# Patient Record
Sex: FEMALE | Race: WHITE | NOT HISPANIC OR LATINO | Employment: OTHER | URBAN - NONMETROPOLITAN AREA
[De-identification: names, ages, dates, MRNs, and addresses within clinical notes are randomized per-mention and may not be internally consistent; named-entity substitution may affect disease eponyms.]

---

## 2023-10-23 ENCOUNTER — HOSPITAL ENCOUNTER (OUTPATIENT)
Facility: HOSPITAL | Age: 78
Setting detail: OBSERVATION
Discharge: HOME | End: 2023-10-24
Attending: INTERNAL MEDICINE | Admitting: INTERNAL MEDICINE
Payer: MEDICARE

## 2023-10-23 ENCOUNTER — APPOINTMENT (OUTPATIENT)
Dept: RADIOLOGY | Facility: HOSPITAL | Age: 78
End: 2023-10-23
Payer: MEDICARE

## 2023-10-23 DIAGNOSIS — I10 HYPERTENSION, UNSPECIFIED TYPE: ICD-10-CM

## 2023-10-23 DIAGNOSIS — R07.9 CHEST PAIN, UNSPECIFIED TYPE: Primary | ICD-10-CM

## 2023-10-23 LAB
ALBUMIN SERPL BCP-MCNC: 4.4 G/DL (ref 3.4–5)
ALP SERPL-CCNC: 86 U/L (ref 33–136)
ALT SERPL W P-5'-P-CCNC: 8 U/L (ref 7–45)
ANION GAP SERPL CALC-SCNC: 17 MMOL/L (ref 10–20)
APPEARANCE UR: CLEAR
AST SERPL W P-5'-P-CCNC: 17 U/L (ref 9–39)
BASOPHILS # BLD AUTO: 0.04 X10*3/UL (ref 0–0.1)
BASOPHILS NFR BLD AUTO: 0.5 %
BILIRUB SERPL-MCNC: 0.7 MG/DL (ref 0–1.2)
BILIRUB UR STRIP.AUTO-MCNC: NEGATIVE MG/DL
BNP SERPL-MCNC: 31 PG/ML (ref 0–99)
BUN SERPL-MCNC: 16 MG/DL (ref 6–23)
CALCIUM SERPL-MCNC: 9.5 MG/DL (ref 8.6–10.3)
CARDIAC TROPONIN I PNL SERPL HS: 4 NG/L (ref 0–13)
CARDIAC TROPONIN I PNL SERPL HS: 5 NG/L (ref 0–13)
CHLORIDE SERPL-SCNC: 88 MMOL/L (ref 98–107)
CO2 SERPL-SCNC: 26 MMOL/L (ref 21–32)
COLOR UR: ABNORMAL
CREAT SERPL-MCNC: 0.9 MG/DL (ref 0.5–1.05)
EOSINOPHIL # BLD AUTO: 0.08 X10*3/UL (ref 0–0.4)
EOSINOPHIL NFR BLD AUTO: 1 %
ERYTHROCYTE [DISTWIDTH] IN BLOOD BY AUTOMATED COUNT: 11.7 % (ref 11.5–14.5)
GFR SERPL CREATININE-BSD FRML MDRD: 66 ML/MIN/1.73M*2
GLUCOSE SERPL-MCNC: 105 MG/DL (ref 74–99)
GLUCOSE UR STRIP.AUTO-MCNC: NEGATIVE MG/DL
HCT VFR BLD AUTO: 41 % (ref 36–46)
HGB BLD-MCNC: 14.4 G/DL (ref 12–16)
HOLD SPECIMEN: NORMAL
IMM GRANULOCYTES # BLD AUTO: 0.02 X10*3/UL (ref 0–0.5)
IMM GRANULOCYTES NFR BLD AUTO: 0.3 % (ref 0–0.9)
INR PPP: 1.1 (ref 0.9–1.1)
KETONES UR STRIP.AUTO-MCNC: ABNORMAL MG/DL
LEUKOCYTE ESTERASE UR QL STRIP.AUTO: NEGATIVE
LYMPHOCYTES # BLD AUTO: 1.68 X10*3/UL (ref 0.8–3)
LYMPHOCYTES NFR BLD AUTO: 21.2 %
MAGNESIUM SERPL-MCNC: 1.74 MG/DL (ref 1.6–2.4)
MCH RBC QN AUTO: 29.5 PG (ref 26–34)
MCHC RBC AUTO-ENTMCNC: 35.1 G/DL (ref 32–36)
MCV RBC AUTO: 84 FL (ref 80–100)
MONOCYTES # BLD AUTO: 0.58 X10*3/UL (ref 0.05–0.8)
MONOCYTES NFR BLD AUTO: 7.3 %
NEUTROPHILS # BLD AUTO: 5.54 X10*3/UL (ref 1.6–5.5)
NEUTROPHILS NFR BLD AUTO: 69.7 %
NITRITE UR QL STRIP.AUTO: NEGATIVE
NRBC BLD-RTO: 0 /100 WBCS (ref 0–0)
PH UR STRIP.AUTO: 6 [PH]
PLATELET # BLD AUTO: 271 X10*3/UL (ref 150–450)
PMV BLD AUTO: 11 FL (ref 7.5–11.5)
POTASSIUM SERPL-SCNC: 3.8 MMOL/L (ref 3.5–5.3)
PROT SERPL-MCNC: 7.5 G/DL (ref 6.4–8.2)
PROT UR STRIP.AUTO-MCNC: NEGATIVE MG/DL
PROTHROMBIN TIME: 12.8 SECONDS (ref 9.8–12.8)
RBC # BLD AUTO: 4.88 X10*6/UL (ref 4–5.2)
RBC # UR STRIP.AUTO: NEGATIVE /UL
SODIUM SERPL-SCNC: 127 MMOL/L (ref 136–145)
SP GR UR STRIP.AUTO: 1.01
UROBILINOGEN UR STRIP.AUTO-MCNC: <2 MG/DL
WBC # BLD AUTO: 7.9 X10*3/UL (ref 4.4–11.3)

## 2023-10-23 PROCEDURE — 36415 COLL VENOUS BLD VENIPUNCTURE: CPT | Performed by: HEALTH CARE PROVIDER

## 2023-10-23 PROCEDURE — 84484 ASSAY OF TROPONIN QUANT: CPT | Performed by: HEALTH CARE PROVIDER

## 2023-10-23 PROCEDURE — 2500000001 HC RX 250 WO HCPCS SELF ADMINISTERED DRUGS (ALT 637 FOR MEDICARE OP): Performed by: NURSE PRACTITIONER

## 2023-10-23 PROCEDURE — 85025 COMPLETE CBC W/AUTO DIFF WBC: CPT | Performed by: HEALTH CARE PROVIDER

## 2023-10-23 PROCEDURE — 80053 COMPREHEN METABOLIC PANEL: CPT | Performed by: HEALTH CARE PROVIDER

## 2023-10-23 PROCEDURE — 99285 EMERGENCY DEPT VISIT HI MDM: CPT

## 2023-10-23 PROCEDURE — 2500000004 HC RX 250 GENERAL PHARMACY W/ HCPCS (ALT 636 FOR OP/ED): Performed by: HEALTH CARE PROVIDER

## 2023-10-23 PROCEDURE — 71045 X-RAY EXAM CHEST 1 VIEW: CPT | Mod: FY,FR

## 2023-10-23 PROCEDURE — 85610 PROTHROMBIN TIME: CPT | Performed by: HEALTH CARE PROVIDER

## 2023-10-23 PROCEDURE — C9113 INJ PANTOPRAZOLE SODIUM, VIA: HCPCS

## 2023-10-23 PROCEDURE — G0378 HOSPITAL OBSERVATION PER HR: HCPCS

## 2023-10-23 PROCEDURE — 81003 URINALYSIS AUTO W/O SCOPE: CPT | Performed by: HEALTH CARE PROVIDER

## 2023-10-23 PROCEDURE — 2500000004 HC RX 250 GENERAL PHARMACY W/ HCPCS (ALT 636 FOR OP/ED): Performed by: NURSE PRACTITIONER

## 2023-10-23 PROCEDURE — 2500000001 HC RX 250 WO HCPCS SELF ADMINISTERED DRUGS (ALT 637 FOR MEDICARE OP)

## 2023-10-23 PROCEDURE — 96374 THER/PROPH/DIAG INJ IV PUSH: CPT

## 2023-10-23 PROCEDURE — 83735 ASSAY OF MAGNESIUM: CPT | Performed by: HEALTH CARE PROVIDER

## 2023-10-23 PROCEDURE — 71045 X-RAY EXAM CHEST 1 VIEW: CPT | Mod: FOREIGN READ | Performed by: RADIOLOGY

## 2023-10-23 PROCEDURE — 82947 ASSAY GLUCOSE BLOOD QUANT: CPT | Performed by: HEALTH CARE PROVIDER

## 2023-10-23 PROCEDURE — 2500000004 HC RX 250 GENERAL PHARMACY W/ HCPCS (ALT 636 FOR OP/ED)

## 2023-10-23 PROCEDURE — 96361 HYDRATE IV INFUSION ADD-ON: CPT

## 2023-10-23 PROCEDURE — 83880 ASSAY OF NATRIURETIC PEPTIDE: CPT | Performed by: HEALTH CARE PROVIDER

## 2023-10-23 RX ORDER — NAPROXEN SODIUM 220 MG/1
TABLET, FILM COATED ORAL
Status: COMPLETED
Start: 2023-10-23 | End: 2023-10-23

## 2023-10-23 RX ORDER — NAPROXEN SODIUM 220 MG/1
324 TABLET, FILM COATED ORAL ONCE
Status: COMPLETED | OUTPATIENT
Start: 2023-10-23 | End: 2023-10-23

## 2023-10-23 RX ORDER — ENOXAPARIN SODIUM 100 MG/ML
40 INJECTION SUBCUTANEOUS EVERY 24 HOURS
Status: DISCONTINUED | OUTPATIENT
Start: 2023-10-23 | End: 2023-10-24 | Stop reason: HOSPADM

## 2023-10-23 RX ORDER — SODIUM CHLORIDE 9 MG/ML
100 INJECTION, SOLUTION INTRAVENOUS CONTINUOUS
Status: DISCONTINUED | OUTPATIENT
Start: 2023-10-23 | End: 2023-10-24

## 2023-10-23 RX ORDER — CYCLOBENZAPRINE HCL 5 MG
5 TABLET ORAL 2 TIMES DAILY PRN
COMMUNITY
Start: 2020-01-13

## 2023-10-23 RX ORDER — PANTOPRAZOLE SODIUM 40 MG/10ML
40 INJECTION, POWDER, LYOPHILIZED, FOR SOLUTION INTRAVENOUS ONCE
Status: COMPLETED | OUTPATIENT
Start: 2023-10-23 | End: 2023-10-23

## 2023-10-23 RX ORDER — ACETAMINOPHEN 325 MG/1
TABLET ORAL
Status: COMPLETED
Start: 2023-10-23 | End: 2023-10-23

## 2023-10-23 RX ORDER — OLMESARTAN MEDOXOMIL 20 MG/1
20 TABLET ORAL 2 TIMES DAILY
COMMUNITY
Start: 2023-10-18

## 2023-10-23 RX ORDER — ACETAMINOPHEN 325 MG/1
650 TABLET ORAL EVERY 4 HOURS PRN
Status: DISCONTINUED | OUTPATIENT
Start: 2023-10-23 | End: 2023-10-24 | Stop reason: HOSPADM

## 2023-10-23 RX ORDER — ONDANSETRON 4 MG/1
4 TABLET, FILM COATED ORAL EVERY 8 HOURS PRN
Status: DISCONTINUED | OUTPATIENT
Start: 2023-10-23 | End: 2023-10-24 | Stop reason: HOSPADM

## 2023-10-23 RX ORDER — ACETAMINOPHEN 325 MG/1
650 TABLET ORAL ONCE
Status: COMPLETED | OUTPATIENT
Start: 2023-10-23 | End: 2023-10-23

## 2023-10-23 RX ORDER — ONDANSETRON HYDROCHLORIDE 2 MG/ML
4 INJECTION, SOLUTION INTRAVENOUS EVERY 8 HOURS PRN
Status: DISCONTINUED | OUTPATIENT
Start: 2023-10-23 | End: 2023-10-24 | Stop reason: HOSPADM

## 2023-10-23 RX ORDER — ALBUTEROL SULFATE 0.83 MG/ML
2.5 SOLUTION RESPIRATORY (INHALATION) EVERY 4 HOURS PRN
COMMUNITY
Start: 2023-09-21

## 2023-10-23 RX ORDER — ACETAMINOPHEN 650 MG/1
650 SUPPOSITORY RECTAL EVERY 4 HOURS PRN
Status: DISCONTINUED | OUTPATIENT
Start: 2023-10-23 | End: 2023-10-24 | Stop reason: HOSPADM

## 2023-10-23 RX ORDER — ASPIRIN 81 MG/1
81 TABLET ORAL DAILY
Status: DISCONTINUED | OUTPATIENT
Start: 2023-10-24 | End: 2023-10-24

## 2023-10-23 RX ORDER — CHOLECALCIFEROL (VITAMIN D3) 25 MCG
2000 TABLET ORAL
COMMUNITY
Start: 2023-10-12

## 2023-10-23 RX ORDER — DIAZEPAM 2 MG/1
2 TABLET ORAL DAILY PRN
COMMUNITY
Start: 2023-10-12 | End: 2023-10-26

## 2023-10-23 RX ORDER — POLYETHYLENE GLYCOL 3350 17 G/17G
17 POWDER, FOR SOLUTION ORAL DAILY
Status: DISCONTINUED | OUTPATIENT
Start: 2023-10-23 | End: 2023-10-24 | Stop reason: HOSPADM

## 2023-10-23 RX ORDER — PANTOPRAZOLE SODIUM 40 MG/10ML
40 INJECTION, POWDER, LYOPHILIZED, FOR SOLUTION INTRAVENOUS
Status: DISCONTINUED | OUTPATIENT
Start: 2023-10-24 | End: 2023-10-24 | Stop reason: HOSPADM

## 2023-10-23 RX ORDER — ALUMINUM HYDROXIDE, MAGNESIUM HYDROXIDE, AND SIMETHICONE 1200; 120; 1200 MG/30ML; MG/30ML; MG/30ML
30 SUSPENSION ORAL EVERY 6 HOURS PRN
Status: DISCONTINUED | OUTPATIENT
Start: 2023-10-23 | End: 2023-10-24 | Stop reason: HOSPADM

## 2023-10-23 RX ORDER — ALUMINUM HYDROXIDE, MAGNESIUM HYDROXIDE, AND SIMETHICONE 1200; 120; 1200 MG/30ML; MG/30ML; MG/30ML
SUSPENSION ORAL
Status: COMPLETED
Start: 2023-10-23 | End: 2023-10-23

## 2023-10-23 RX ORDER — ACETAMINOPHEN 160 MG/5ML
650 SOLUTION ORAL EVERY 4 HOURS PRN
Status: DISCONTINUED | OUTPATIENT
Start: 2023-10-23 | End: 2023-10-24 | Stop reason: HOSPADM

## 2023-10-23 RX ORDER — ASPIRIN 81 MG/1
81 TABLET ORAL
COMMUNITY

## 2023-10-23 RX ORDER — HYDROCHLOROTHIAZIDE 12.5 MG/1
12.5 CAPSULE ORAL
COMMUNITY
Start: 2023-10-18 | End: 2023-10-24 | Stop reason: HOSPADM

## 2023-10-23 RX ORDER — PANTOPRAZOLE SODIUM 40 MG/1
40 TABLET, DELAYED RELEASE ORAL
Status: DISCONTINUED | OUTPATIENT
Start: 2023-10-24 | End: 2023-10-24 | Stop reason: HOSPADM

## 2023-10-23 RX ORDER — GABAPENTIN 300 MG/1
300 CAPSULE ORAL 2 TIMES DAILY
COMMUNITY
Start: 2023-10-12 | End: 2023-11-11

## 2023-10-23 RX ORDER — PANTOPRAZOLE SODIUM 40 MG/10ML
INJECTION, POWDER, LYOPHILIZED, FOR SOLUTION INTRAVENOUS
Status: COMPLETED
Start: 2023-10-23 | End: 2023-10-23

## 2023-10-23 RX ORDER — ALBUTEROL SULFATE 90 UG/1
2 AEROSOL, METERED RESPIRATORY (INHALATION) EVERY 6 HOURS PRN
COMMUNITY
Start: 2023-09-26

## 2023-10-23 RX ORDER — ALUMINUM HYDROXIDE, MAGNESIUM HYDROXIDE, AND SIMETHICONE 1200; 120; 1200 MG/30ML; MG/30ML; MG/30ML
30 SUSPENSION ORAL ONCE
Status: COMPLETED | OUTPATIENT
Start: 2023-10-23 | End: 2023-10-23

## 2023-10-23 RX ORDER — TRAMADOL HYDROCHLORIDE 50 MG/1
25 TABLET ORAL NIGHTLY PRN
COMMUNITY

## 2023-10-23 RX ADMIN — PANTOPRAZOLE SODIUM 40 MG: 40 INJECTION, POWDER, LYOPHILIZED, FOR SOLUTION INTRAVENOUS at 15:14

## 2023-10-23 RX ADMIN — ALUMINUM HYDROXIDE, MAGNESIUM HYDROXIDE, AND SIMETHICONE 30 ML: 200; 200; 20 SUSPENSION ORAL at 21:18

## 2023-10-23 RX ADMIN — ACETAMINOPHEN 650 MG: 325 TABLET ORAL at 16:17

## 2023-10-23 RX ADMIN — PANTOPRAZOLE SODIUM 40 MG: 40 INJECTION, POWDER, FOR SOLUTION INTRAVENOUS at 15:14

## 2023-10-23 RX ADMIN — SODIUM CHLORIDE 100 ML/HR: 9 INJECTION, SOLUTION INTRAVENOUS at 21:18

## 2023-10-23 RX ADMIN — SODIUM CHLORIDE 1000 ML: 9 INJECTION, SOLUTION INTRAVENOUS at 16:21

## 2023-10-23 RX ADMIN — NAPROXEN SODIUM 324 MG: 220 TABLET, FILM COATED ORAL at 15:18

## 2023-10-23 RX ADMIN — ALUMINUM HYDROXIDE, MAGNESIUM HYDROXIDE, AND SIMETHICONE 30 ML: 1200; 120; 1200 SUSPENSION ORAL at 16:18

## 2023-10-23 RX ADMIN — ALUMINUM HYDROXIDE, MAGNESIUM HYDROXIDE, AND SIMETHICONE 30 ML: 200; 200; 20 SUSPENSION ORAL at 16:18

## 2023-10-23 RX ADMIN — ASPIRIN 81 MG CHEWABLE TABLET 324 MG: 81 TABLET CHEWABLE at 15:18

## 2023-10-23 RX ADMIN — ACETAMINOPHEN 650 MG: 325 TABLET ORAL at 21:18

## 2023-10-23 SDOH — SOCIAL STABILITY: SOCIAL INSECURITY: WERE YOU ABLE TO COMPLETE ALL THE BEHAVIORAL HEALTH SCREENINGS?: YES

## 2023-10-23 SDOH — SOCIAL STABILITY: SOCIAL INSECURITY: HAS ANYONE EVER THREATENED TO HURT YOUR FAMILY OR YOUR PETS?: NO

## 2023-10-23 SDOH — SOCIAL STABILITY: SOCIAL INSECURITY: ARE THERE ANY APPARENT SIGNS OF INJURIES/BEHAVIORS THAT COULD BE RELATED TO ABUSE/NEGLECT?: NO

## 2023-10-23 SDOH — SOCIAL STABILITY: SOCIAL INSECURITY: ABUSE: ADULT

## 2023-10-23 SDOH — SOCIAL STABILITY: SOCIAL INSECURITY: ARE YOU OR HAVE YOU BEEN THREATENED OR ABUSED PHYSICALLY, EMOTIONALLY, OR SEXUALLY BY ANYONE?: NO

## 2023-10-23 SDOH — SOCIAL STABILITY: SOCIAL INSECURITY: DO YOU FEEL UNSAFE GOING BACK TO THE PLACE WHERE YOU ARE LIVING?: NO

## 2023-10-23 SDOH — SOCIAL STABILITY: SOCIAL INSECURITY: HAVE YOU HAD THOUGHTS OF HARMING ANYONE ELSE?: NO

## 2023-10-23 SDOH — SOCIAL STABILITY: SOCIAL INSECURITY: DO YOU FEEL ANYONE HAS EXPLOITED OR TAKEN ADVANTAGE OF YOU FINANCIALLY OR OF YOUR PERSONAL PROPERTY?: NO

## 2023-10-23 SDOH — SOCIAL STABILITY: SOCIAL INSECURITY: DOES ANYONE TRY TO KEEP YOU FROM HAVING/CONTACTING OTHER FRIENDS OR DOING THINGS OUTSIDE YOUR HOME?: NO

## 2023-10-23 ASSESSMENT — ACTIVITIES OF DAILY LIVING (ADL)
HEARING - RIGHT EAR: FUNCTIONAL
GROOMING: INDEPENDENT
HEARING - LEFT EAR: FUNCTIONAL
BATHING: INDEPENDENT
HEARING - RIGHT EAR: FUNCTIONAL
HEARING - LEFT EAR: FUNCTIONAL
TOILETING: INDEPENDENT
FEEDING YOURSELF: INDEPENDENT
FEEDING YOURSELF: INDEPENDENT
DRESSING YOURSELF: INDEPENDENT
WALKS IN HOME: INDEPENDENT
TOILETING: INDEPENDENT
ADEQUATE_TO_COMPLETE_ADL: YES
DRESSING YOURSELF: INDEPENDENT
LACK_OF_TRANSPORTATION: NO
PATIENT'S MEMORY ADEQUATE TO SAFELY COMPLETE DAILY ACTIVITIES?: YES
BATHING: INDEPENDENT
ADEQUATE_TO_COMPLETE_ADL: YES
GROOMING: INDEPENDENT
PATIENT'S MEMORY ADEQUATE TO SAFELY COMPLETE DAILY ACTIVITIES?: YES
ASSISTIVE_DEVICE: EYEGLASSES
JUDGMENT_ADEQUATE_SAFELY_COMPLETE_DAILY_ACTIVITIES: YES
JUDGMENT_ADEQUATE_SAFELY_COMPLETE_DAILY_ACTIVITIES: YES
WALKS IN HOME: INDEPENDENT

## 2023-10-23 ASSESSMENT — LIFESTYLE VARIABLES
SKIP TO QUESTIONS 9-10: 1
HOW OFTEN DO YOU HAVE A DRINK CONTAINING ALCOHOL: NEVER
AUDIT-C TOTAL SCORE: 0
HOW MANY STANDARD DRINKS CONTAINING ALCOHOL DO YOU HAVE ON A TYPICAL DAY: PATIENT DOES NOT DRINK
AUDIT-C TOTAL SCORE: 0
HOW OFTEN DO YOU HAVE 6 OR MORE DRINKS ON ONE OCCASION: NEVER

## 2023-10-23 ASSESSMENT — HEART SCORE
HISTORY: SLIGHTLY SUSPICIOUS
HEART SCORE: 4
RISK FACTORS: >2 RISK FACTORS OR HX OF ATHEROSCLEROTIC DISEASE
AGE: 65+
TROPONIN: LESS THAN OR EQUAL TO NORMAL LIMIT
ECG: NORMAL

## 2023-10-23 ASSESSMENT — COGNITIVE AND FUNCTIONAL STATUS - GENERAL
PATIENT BASELINE BEDBOUND: NO
DAILY ACTIVITIY SCORE: 24
MOBILITY SCORE: 24

## 2023-10-23 ASSESSMENT — PAIN SCALES - GENERAL
PAINLEVEL_OUTOF10: 4
PAINLEVEL_OUTOF10: 4
PAINLEVEL_OUTOF10: 6
PAINLEVEL_OUTOF10: 4

## 2023-10-23 ASSESSMENT — PAIN DESCRIPTION - LOCATION: LOCATION: HEAD

## 2023-10-23 ASSESSMENT — COLUMBIA-SUICIDE SEVERITY RATING SCALE - C-SSRS
1. IN THE PAST MONTH, HAVE YOU WISHED YOU WERE DEAD OR WISHED YOU COULD GO TO SLEEP AND NOT WAKE UP?: NO
2. HAVE YOU ACTUALLY HAD ANY THOUGHTS OF KILLING YOURSELF?: NO
6. HAVE YOU EVER DONE ANYTHING, STARTED TO DO ANYTHING, OR PREPARED TO DO ANYTHING TO END YOUR LIFE?: NO

## 2023-10-23 ASSESSMENT — PAIN SCALES - WONG BAKER: WONGBAKER_NUMERICALRESPONSE: HURTS LITTLE MORE

## 2023-10-23 ASSESSMENT — PATIENT HEALTH QUESTIONNAIRE - PHQ9
2. FEELING DOWN, DEPRESSED OR HOPELESS: NOT AT ALL
1. LITTLE INTEREST OR PLEASURE IN DOING THINGS: NOT AT ALL
SUM OF ALL RESPONSES TO PHQ9 QUESTIONS 1 & 2: 0

## 2023-10-23 ASSESSMENT — PAIN DESCRIPTION - DESCRIPTORS: DESCRIPTORS: DISCOMFORT

## 2023-10-23 ASSESSMENT — PAIN DESCRIPTION - PROGRESSION: CLINICAL_PROGRESSION: NOT CHANGED

## 2023-10-23 ASSESSMENT — PAIN - FUNCTIONAL ASSESSMENT
PAIN_FUNCTIONAL_ASSESSMENT: 0-10
PAIN_FUNCTIONAL_ASSESSMENT: 0-10

## 2023-10-23 NOTE — ED PROVIDER NOTES
HPI   Chief Complaint   Patient presents with    Hypertension     Hypertension , headache , fatigue       CC:, Hypertension, chest tightness  HPI:   78-year-old female with history of hypertension, SVT, LVH, migraines, presents ED complaining of hypertension, patient states that she was recently taken off of her diltiazem approximately a week ago and placed on 12.5 mg hydrochlorothiazide, she is currently on 50 mg of losartan daily.  Patient is complaining of some tightness burning sensation midsternal, she also reports increased fatigue, exertional shortness of breath.  Denies having any fever, chills, she denies any headache, dizziness.  Does not take any long-term anticoagulant or antiplatelet medications.  Denies any recent weight gain or lower extremity edema.      Additional Limitations to History:   External Records Reviewed: I reviewed recent and relevant outside records including   History Obtained From:     Past Medical History: Per HPI  Medications: Reviewed in EMR and with patient  Allergies:  Reviewed in EMR  Past Surgical History:   Social History:     ------------------------------------------------------------------------------------------------------  Physical Exam:  --Vital signs reviewed in nursing triage note, EMR flow sheets, and at patient's bedside  GEN:  A&Ox3, no acute distress, appears comfortable.  Conversational and appropriate.  No confusion or gross mental status changes.  EYES: EOMI, non-injected sclera.  ENT: Moist mucous membranes, no apparent injuries or lesions.   CARDIO: Normal rate and regular rhythm. No murmurs, rubs, or gallops.  2+ equal pulses of the distal extremities.   PULM: Clear to auscultation bilaterally. No rales, rhonchi, or wheezes. Good symmetric chest expansion.  GI: Soft, non-tender, non-distended. No rebound tenderness or guarding.  SKIN: Warm and dry, no rashes or lesions.  MSK: ROM intact the extremities without contractures.   EXT: No peripheral edema,  contusions, or wounds.   NEURO: Cranial nerves II-XII grossly intact. Sensation to light touch intact and equal bilaterally in upper and lower extremities.  Symmetric 5/5 strength in upper and lower extremities.  PSYCH: Appropriate mood and behavior, converses and responds appropriately during exam.  -------------------------------------------------------------------------------------------------------    Medical Decision Making:      Differential Diagnoses Considered:   Chronic Medical Conditions Significantly Affecting Care:   Diagnostic testing considered: [PERC, D-Dimer, PECARN, etc.]    - EKG interpreted by myself sinus rhythm, ventricular rate 82, no obvious ST elevation/depression or T wave inversion no acute ischemic findings  - I independently interpreted: [CXR, CT, POCUS, etc. including your interpretation]  - Labs notable for     Escalation of Care: Appropriate for   Social Determinants of Health Significantly Affecting Care: [Homelessness, lacking transportation, uninsured, unable to afford medications]  Prescription Drug Consideration: [Antibiotics, antivirals, pain medications, etc.]  Discussion of Management with Other Providers:  I discussed the patient/results with: [admitting team, consultant, radiologist, social work, EPAT, case management, PT/OT, RT, PCP, etc.]      Nikolay Acevedo PA-C      Chest tightness                    No data recorded                Patient History   Past Medical History:   Diagnosis Date    Personal history of other diseases of the circulatory system 03/04/2020    History of hypertension    Personal history of other diseases of the musculoskeletal system and connective tissue 03/04/2020    History of fibromyalgia     Past Surgical History:   Procedure Laterality Date    OTHER SURGICAL HISTORY  03/04/2020    Appendectomy laparoscopic     No family history on file.  Social History     Tobacco Use    Smoking status: Not on file    Smokeless tobacco: Not on file   Substance  Use Topics    Alcohol use: Not on file    Drug use: Not on file       Physical Exam   ED Triage Vitals [10/23/23 1418]   Temp Heart Rate Resp BP   36.6 °C (97.8 °F) 101 14 (!) 152/112      SpO2 Temp Source Heart Rate Source Patient Position   96 % Tympanic -- Sitting      BP Location FiO2 (%)     Left arm --       Physical Exam    ED Course & MDM   Diagnoses as of 10/23/23 1709   Chest pain, unspecified type       Medical Decision Making  78-year-old female with complaints of nonspecific chest tightness or heaviness, hypertension, patient is hemodynamically stable, nontoxic-appearing, well-hydrated afebrile in no acute distress, low suspicion for acute coronary event or pulmonary emboli, low suspicion for hypertensive emergency or urgency.  Laboratory work-up notable for mild hyponatremia she was given IV fluids patient will be admitted to telemetry observation for further evaluation        Procedure  Procedures     Nikolay Acevedo PA-C  11/06/23 0737

## 2023-10-24 ENCOUNTER — APPOINTMENT (OUTPATIENT)
Dept: CARDIOLOGY | Facility: HOSPITAL | Age: 78
End: 2023-10-24
Payer: MEDICARE

## 2023-10-24 VITALS
HEIGHT: 65 IN | WEIGHT: 133.82 LBS | BODY MASS INDEX: 22.3 KG/M2 | TEMPERATURE: 97.7 F | RESPIRATION RATE: 16 BRPM | SYSTOLIC BLOOD PRESSURE: 131 MMHG | HEART RATE: 67 BPM | DIASTOLIC BLOOD PRESSURE: 77 MMHG | OXYGEN SATURATION: 97 %

## 2023-10-24 PROBLEM — M79.7 FIBROMYALGIA: Status: ACTIVE | Noted: 2023-10-24

## 2023-10-24 PROBLEM — J45.909 ASTHMA (HHS-HCC): Status: ACTIVE | Noted: 2023-10-24

## 2023-10-24 PROBLEM — I10 HTN (HYPERTENSION): Status: ACTIVE | Noted: 2023-10-24

## 2023-10-24 PROBLEM — I47.10 SVT (SUPRAVENTRICULAR TACHYCARDIA) (CMS-HCC): Status: ACTIVE | Noted: 2023-10-24

## 2023-10-24 PROBLEM — I38: Status: ACTIVE | Noted: 2023-10-24

## 2023-10-24 PROBLEM — F41.9 ANXIETY: Status: ACTIVE | Noted: 2023-10-24

## 2023-10-24 PROBLEM — E78.5 HLD (HYPERLIPIDEMIA): Status: ACTIVE | Noted: 2023-10-24

## 2023-10-24 LAB
ANION GAP SERPL CALC-SCNC: 13 MMOL/L (ref 10–20)
BUN SERPL-MCNC: 13 MG/DL (ref 6–23)
CALCIUM SERPL-MCNC: 8.4 MG/DL (ref 8.6–10.3)
CHLORIDE SERPL-SCNC: 99 MMOL/L (ref 98–107)
CHOLEST SERPL-MCNC: 194 MG/DL (ref 0–199)
CHOLESTEROL/HDL RATIO: 3.5
CO2 SERPL-SCNC: 22 MMOL/L (ref 21–32)
CREAT SERPL-MCNC: 0.77 MG/DL (ref 0.5–1.05)
CRP SERPL-MCNC: 0.43 MG/DL
EJECTION FRACTION APICAL 4 CHAMBER: 58.1
ERYTHROCYTE [DISTWIDTH] IN BLOOD BY AUTOMATED COUNT: 11.7 % (ref 11.5–14.5)
ERYTHROCYTE [SEDIMENTATION RATE] IN BLOOD BY WESTERGREN METHOD: 2 MM/H (ref 0–30)
GFR SERPL CREATININE-BSD FRML MDRD: 80 ML/MIN/1.73M*2
GLUCOSE SERPL-MCNC: 97 MG/DL (ref 74–99)
HCT VFR BLD AUTO: 36 % (ref 36–46)
HDLC SERPL-MCNC: 56.2 MG/DL
HGB BLD-MCNC: 12.5 G/DL (ref 12–16)
LDLC SERPL CALC-MCNC: 123 MG/DL
MCH RBC QN AUTO: 29.7 PG (ref 26–34)
MCHC RBC AUTO-ENTMCNC: 34.7 G/DL (ref 32–36)
MCV RBC AUTO: 86 FL (ref 80–100)
NON HDL CHOLESTEROL: 138 MG/DL (ref 0–149)
NRBC BLD-RTO: 0 /100 WBCS (ref 0–0)
PLATELET # BLD AUTO: 217 X10*3/UL (ref 150–450)
PMV BLD AUTO: 10.5 FL (ref 7.5–11.5)
POTASSIUM SERPL-SCNC: 3.6 MMOL/L (ref 3.5–5.3)
RBC # BLD AUTO: 4.21 X10*6/UL (ref 4–5.2)
SODIUM SERPL-SCNC: 130 MMOL/L (ref 136–145)
TRIGL SERPL-MCNC: 75 MG/DL (ref 0–149)
VLDL: 15 MG/DL (ref 0–40)
WBC # BLD AUTO: 5.4 X10*3/UL (ref 4.4–11.3)

## 2023-10-24 PROCEDURE — 85652 RBC SED RATE AUTOMATED: CPT | Performed by: NURSE PRACTITIONER

## 2023-10-24 PROCEDURE — 96361 HYDRATE IV INFUSION ADD-ON: CPT

## 2023-10-24 PROCEDURE — 2500000004 HC RX 250 GENERAL PHARMACY W/ HCPCS (ALT 636 FOR OP/ED): Performed by: NURSE PRACTITIONER

## 2023-10-24 PROCEDURE — 86140 C-REACTIVE PROTEIN: CPT | Performed by: NURSE PRACTITIONER

## 2023-10-24 PROCEDURE — 80061 LIPID PANEL: CPT | Performed by: NURSE PRACTITIONER

## 2023-10-24 PROCEDURE — 93306 TTE W/DOPPLER COMPLETE: CPT

## 2023-10-24 PROCEDURE — 36415 COLL VENOUS BLD VENIPUNCTURE: CPT | Performed by: NURSE PRACTITIONER

## 2023-10-24 PROCEDURE — G0378 HOSPITAL OBSERVATION PER HR: HCPCS

## 2023-10-24 PROCEDURE — 2500000001 HC RX 250 WO HCPCS SELF ADMINISTERED DRUGS (ALT 637 FOR MEDICARE OP): Performed by: NURSE PRACTITIONER

## 2023-10-24 PROCEDURE — 85027 COMPLETE CBC AUTOMATED: CPT | Performed by: NURSE PRACTITIONER

## 2023-10-24 PROCEDURE — 99233 SBSQ HOSP IP/OBS HIGH 50: CPT | Performed by: NURSE PRACTITIONER

## 2023-10-24 PROCEDURE — G0316 PR PROLONGED INPATIENT/OBSERVATION EM SVC EA 15 MIN: HCPCS | Performed by: NURSE PRACTITIONER

## 2023-10-24 PROCEDURE — 80048 BASIC METABOLIC PNL TOTAL CA: CPT | Performed by: NURSE PRACTITIONER

## 2023-10-24 RX ORDER — NEBIVOLOL 2.5 MG/1
2.5 TABLET ORAL DAILY
Status: DISCONTINUED | OUTPATIENT
Start: 2023-10-24 | End: 2023-10-24 | Stop reason: HOSPADM

## 2023-10-24 RX ORDER — OLMESARTAN MEDOXOMIL 20 MG/1
20 TABLET ORAL 2 TIMES DAILY
Status: DISCONTINUED | OUTPATIENT
Start: 2023-10-24 | End: 2023-10-24 | Stop reason: HOSPADM

## 2023-10-24 RX ORDER — HYDROCHLOROTHIAZIDE 12.5 MG/1
12.5 TABLET ORAL DAILY
Status: DISCONTINUED | OUTPATIENT
Start: 2023-10-24 | End: 2023-10-24 | Stop reason: HOSPADM

## 2023-10-24 RX ORDER — GABAPENTIN 300 MG/1
300 CAPSULE ORAL 2 TIMES DAILY
Status: DISCONTINUED | OUTPATIENT
Start: 2023-10-24 | End: 2023-10-24 | Stop reason: HOSPADM

## 2023-10-24 RX ORDER — ASPIRIN 81 MG/1
81 TABLET ORAL
Status: DISCONTINUED | OUTPATIENT
Start: 2023-10-24 | End: 2023-10-24 | Stop reason: HOSPADM

## 2023-10-24 RX ORDER — ALBUTEROL SULFATE 90 UG/1
2 AEROSOL, METERED RESPIRATORY (INHALATION) EVERY 6 HOURS PRN
Status: DISCONTINUED | OUTPATIENT
Start: 2023-10-24 | End: 2023-10-24

## 2023-10-24 RX ORDER — CYCLOBENZAPRINE HCL 5 MG
5 TABLET ORAL 2 TIMES DAILY PRN
Status: DISCONTINUED | OUTPATIENT
Start: 2023-10-24 | End: 2023-10-24 | Stop reason: HOSPADM

## 2023-10-24 RX ORDER — CHOLECALCIFEROL (VITAMIN D3) 25 MCG
2000 TABLET ORAL
Status: DISCONTINUED | OUTPATIENT
Start: 2023-10-24 | End: 2023-10-24 | Stop reason: HOSPADM

## 2023-10-24 RX ORDER — ALBUTEROL SULFATE 0.83 MG/ML
2.5 SOLUTION RESPIRATORY (INHALATION) EVERY 4 HOURS PRN
Status: DISCONTINUED | OUTPATIENT
Start: 2023-10-24 | End: 2023-10-24 | Stop reason: HOSPADM

## 2023-10-24 RX ORDER — DIAZEPAM 2 MG/1
2 TABLET ORAL DAILY PRN
Status: DISCONTINUED | OUTPATIENT
Start: 2023-10-24 | End: 2023-10-24 | Stop reason: HOSPADM

## 2023-10-24 RX ORDER — NEBIVOLOL 2.5 MG/1
2.5 TABLET ORAL DAILY
Qty: 30 TABLET | Refills: 0 | Status: SHIPPED | OUTPATIENT
Start: 2023-10-25 | End: 2023-11-24

## 2023-10-24 RX ORDER — TRAMADOL HYDROCHLORIDE 50 MG/1
25 TABLET ORAL NIGHTLY PRN
Status: DISCONTINUED | OUTPATIENT
Start: 2023-10-24 | End: 2023-10-24 | Stop reason: HOSPADM

## 2023-10-24 RX ADMIN — ACETAMINOPHEN 650 MG: 325 TABLET ORAL at 11:26

## 2023-10-24 RX ADMIN — SODIUM CHLORIDE 100 ML/HR: 9 INJECTION, SOLUTION INTRAVENOUS at 08:52

## 2023-10-24 RX ADMIN — GABAPENTIN 300 MG: 300 CAPSULE ORAL at 01:53

## 2023-10-24 RX ADMIN — ASPIRIN 81 MG: 81 TABLET, COATED ORAL at 06:44

## 2023-10-24 RX ADMIN — OLMESARTAN MEDOXOMIL 20 MG: 20 TABLET, FILM COATED ORAL at 09:00

## 2023-10-24 RX ADMIN — PANTOPRAZOLE SODIUM 40 MG: 40 TABLET, DELAYED RELEASE ORAL at 06:44

## 2023-10-24 RX ADMIN — Medication 2000 UNITS: at 06:44

## 2023-10-24 RX ADMIN — OLMESARTAN MEDOXOMIL 20 MG: 20 TABLET, FILM COATED ORAL at 01:45

## 2023-10-24 SDOH — ECONOMIC STABILITY: FOOD INSECURITY: WITHIN THE PAST 12 MONTHS, THE FOOD YOU BOUGHT JUST DIDN'T LAST AND YOU DIDN'T HAVE MONEY TO GET MORE.: NEVER TRUE

## 2023-10-24 SDOH — ECONOMIC STABILITY: INCOME INSECURITY: HOW HARD IS IT FOR YOU TO PAY FOR THE VERY BASICS LIKE FOOD, HOUSING, MEDICAL CARE, AND HEATING?: NOT HARD AT ALL

## 2023-10-24 SDOH — SOCIAL STABILITY: SOCIAL INSECURITY
WITHIN THE LAST YEAR, HAVE YOU BEEN KICKED, HIT, SLAPPED, OR OTHERWISE PHYSICALLY HURT BY YOUR PARTNER OR EX-PARTNER?: NO

## 2023-10-24 SDOH — ECONOMIC STABILITY: INCOME INSECURITY: IN THE LAST 12 MONTHS, WAS THERE A TIME WHEN YOU WERE NOT ABLE TO PAY THE MORTGAGE OR RENT ON TIME?: NO

## 2023-10-24 SDOH — SOCIAL STABILITY: SOCIAL INSECURITY
WITHIN THE LAST YEAR, HAVE TO BEEN RAPED OR FORCED TO HAVE ANY KIND OF SEXUAL ACTIVITY BY YOUR PARTNER OR EX-PARTNER?: NO

## 2023-10-24 SDOH — SOCIAL STABILITY: SOCIAL INSECURITY: WITHIN THE LAST YEAR, HAVE YOU BEEN AFRAID OF YOUR PARTNER OR EX-PARTNER?: NO

## 2023-10-24 SDOH — ECONOMIC STABILITY: FOOD INSECURITY: WITHIN THE PAST 12 MONTHS, YOU WORRIED THAT YOUR FOOD WOULD RUN OUT BEFORE YOU GOT MONEY TO BUY MORE.: NEVER TRUE

## 2023-10-24 SDOH — ECONOMIC STABILITY: INCOME INSECURITY: IN THE PAST 12 MONTHS, HAS THE ELECTRIC, GAS, OIL, OR WATER COMPANY THREATENED TO SHUT OFF SERVICE IN YOUR HOME?: NO

## 2023-10-24 SDOH — ECONOMIC STABILITY: TRANSPORTATION INSECURITY
IN THE PAST 12 MONTHS, HAS LACK OF TRANSPORTATION KEPT YOU FROM MEETINGS, WORK, OR FROM GETTING THINGS NEEDED FOR DAILY LIVING?: NO

## 2023-10-24 SDOH — ECONOMIC STABILITY: HOUSING INSECURITY
IN THE LAST 12 MONTHS, WAS THERE A TIME WHEN YOU DID NOT HAVE A STEADY PLACE TO SLEEP OR SLEPT IN A SHELTER (INCLUDING NOW)?: NO

## 2023-10-24 SDOH — SOCIAL STABILITY: SOCIAL INSECURITY: WITHIN THE LAST YEAR, HAVE YOU BEEN HUMILIATED OR EMOTIONALLY ABUSED IN OTHER WAYS BY YOUR PARTNER OR EX-PARTNER?: NO

## 2023-10-24 SDOH — ECONOMIC STABILITY: HOUSING INSECURITY: IN THE LAST 12 MONTHS, HOW MANY PLACES HAVE YOU LIVED?: 1

## 2023-10-24 SDOH — ECONOMIC STABILITY: TRANSPORTATION INSECURITY
IN THE PAST 12 MONTHS, HAS THE LACK OF TRANSPORTATION KEPT YOU FROM MEDICAL APPOINTMENTS OR FROM GETTING MEDICATIONS?: NO

## 2023-10-24 ASSESSMENT — PAIN - FUNCTIONAL ASSESSMENT
PAIN_FUNCTIONAL_ASSESSMENT: 0-10

## 2023-10-24 ASSESSMENT — ENCOUNTER SYMPTOMS
HEADACHES: 1
WEAKNESS: 1
FATIGUE: 1

## 2023-10-24 ASSESSMENT — PAIN SCALES - GENERAL
PAINLEVEL_OUTOF10: 2
PAINLEVEL_OUTOF10: 0 - NO PAIN
PAINLEVEL_OUTOF10: 3
PAINLEVEL_OUTOF10: 0 - NO PAIN

## 2023-10-24 NOTE — CARE PLAN
The patient's goals for the shift include  no headache    The clinical goals for the shift include Have Tele be stable in SR    Discharge home

## 2023-10-24 NOTE — NURSING NOTE
Aware new script available for  @ pharmacy.  Copy of AVS given.  States +understanding.  Ride in room.

## 2023-10-24 NOTE — PROGRESS NOTES
TCC spoke with patient regarding discharge planning and home going needs. Patient lives alone.  She says she is very independent with ADLS and ambulation.  She drives.  She lives in a 2 story house with stairs/rails.  She recently started on lasix and has a BSC on the first floor since the bathroom is upstairs.  Confirmed with patient PCP is Jennifer Ortega. Discharge Plan is for patient to  home with no HHC needs.

## 2023-10-24 NOTE — DISCHARGE SUMMARY
"Discharge Diagnosis  Chest pain    Discharge Meds     Your medication list        START taking these medications        Instructions Last Dose Given Next Dose Due   nebivolol 2.5 mg tablet  Commonly known as: Bystolic  Start taking on: October 25, 2023      Take 1 tablet (2.5 mg) by mouth once daily. Do not start before October 25, 2023.              CONTINUE taking these medications        Instructions Last Dose Given Next Dose Due   albuterol 2.5 mg /3 mL (0.083 %) nebulizer solution           albuterol 90 mcg/actuation inhaler           aspirin 81 mg EC tablet           cholecalciferol 25 MCG (1000 UT) tablet  Commonly known as: Vitamin D-3           cyclobenzaprine 5 mg tablet  Commonly known as: Flexeril           diazePAM 2 mg tablet  Commonly known as: Valium           gabapentin 300 mg capsule  Commonly known as: Neurontin           olmesartan 20 mg tablet  Commonly known as: BENIcar           traMADol 50 mg tablet  Commonly known as: Ultram                  STOP taking these medications      hydroCHLOROthiazide 12.5 mg capsule  Commonly known as: Microzide                  Where to Get Your Medications        These medications were sent to [id number] - [place], [place] - [address]  [address] [address]      Phone: [phone]   nebivolol 2.5 mg tablet         Test Results Pending At Discharge  Pending Labs       No current pending labs.            Hospital Course   Cindy Sesay 78 y.o. 1945      History Of Present Illness  Cindy Sesay is a 78 y.o. female presented to [facility] ED from home.  Patient states  for the past several  days she has decreased energy,  poor appetite and general  malaise.  She c/o dyspepsia. She  states this AM   she  developed mid sternal  non radiating chest pain. She feels the pain more epigastric. She was  recently in  Dr. Louis's cardiology office and  discussed  hydrochlorothiazide. She states that  \"years\"ago  she was " given hydrochlorothiazide and developed dyspepsia.  In ED Troponin Negative  EKG unremarkable.  Patient requested Mylanta. She reported positive  results from  Mylanta.  Patient denies Pain, CP, N/V/D SOB. No  acute distress          Past Medical History  Medical History        Past Medical History:   Diagnosis Date    Personal history of other diseases of the circulatory system 03/04/2020     History of hypertension    Personal history of other diseases of the musculoskeletal system and connective tissue 03/04/2020     History of fibromyalgia            Surgical History  She has a past surgical history that includes Other surgical history (03/04/2020).     Social History  Social History               Socioeconomic History    Marital status:        Spouse name: Not on file    Number of children: Not on file    Years of education: Not on file    Highest education level: Not on file   Occupational History    Not on file   Tobacco Use    Smoking status: Former       Types: Cigarettes    Smokeless tobacco: Never   Substance and Sexual Activity    Alcohol use: Not on file    Drug use: Not on file    Sexual activity: Not on file   Other Topics Concern    Not on file   Social History Narrative    Not on file      Social Determinants of Health           Financial Resource Strain: Low Risk  (10/23/2023)     Overall Financial Resource Strain (CARDIA)      Difficulty of Paying Living Expenses: Not hard at all   Food Insecurity: Not on file   Transportation Needs: No Transportation Needs (10/23/2023)     PRAPARE - Transportation      Lack of Transportation (Medical): No      Lack of Transportation (Non-Medical): No   Physical Activity: Not on file   Stress: Not on file   Social Connections: Not on file   Intimate Partner Violence: Not on file   Housing Stability: Low Risk  (10/23/2023)     Housing Stability Vital Sign      Unable to Pay for Housing in the Last Year: No      Number of Places Lived in the Last Year: 1       Unstable Housing in the Last Year: No            Family History  Family History   No family history on file.        Allergies        Allergies   Allergen Reactions    Diltiazem Other       Chest Pain    Lopressor [Metoprolol Tartrate] Shortness of breath and Hallucinations    Molds Extract Shortness of breath    Flagyl [Metronidazole] Nausea/vomiting    Paxil [Paroxetine Hcl] Chills and Nausea/vomiting         Vital Signs  Temp:  [36.1 °C (97 °F)-36.7 °C (98.1 °F)] 36.1 °C (97 °F)  Heart Rate:  [] 65  Resp:  [14-27] 18  BP: (148-170)/() 149/78     Home Medications   Prior to Admission Medications   Prescriptions Last Dose Informant Patient Reported? Taking?   albuterol 2.5 mg /3 mL (0.083 %) nebulizer solution Past Month   Yes Yes   Sig: Inhale 3 mL (2.5 mg) every 4 hours if needed for shortness of breath.   albuterol 90 mcg/actuation inhaler Past Month   Yes Yes   Sig: Inhale 2 puffs every 6 hours if needed for shortness of breath.   aspirin 81 mg EC tablet 10/23/2023   Yes No   Sig: Take 1 tablet (81 mg) by mouth once daily.   cholecalciferol (Vitamin D-3) 25 MCG (1000 UT) tablet 10/23/2023   Yes Yes   Sig: Take 2 tablets (2,000 Units) by mouth once daily.   cyclobenzaprine (Flexeril) 5 mg tablet More than a month   Yes Yes   Sig: Take 1 tablet (5 mg) by mouth 2 times a day as needed.   diazePAM (Valium) 2 mg tablet Past Month   Yes Yes   Sig: Take 1 tablet (2 mg) by mouth once daily as needed.   gabapentin (Neurontin) 300 mg capsule Past Month   Yes Yes   Sig: Take 1 capsule (300 mg) by mouth 2 times a day.   hydroCHLOROthiazide (Microzide) 12.5 mg capsule 10/23/2023   Yes Yes   Sig: Take 1 capsule (12.5 mg) by mouth once daily.   olmesartan (BENIcar) 20 mg tablet 10/23/2023   Yes Yes   Sig: Take 1 tablet (20 mg) by mouth twice a day.   traMADol (Ultram) 50 mg tablet Past Week   Yes No   Sig: Take 0.5 tablets (25 mg) by mouth as needed at bedtime.      Facility-Administered Medications: None      XR  "chest 1 view  Result Date: 10/23/2023  No acute process. Signed by Harshal Desouza MD     XR CHEST 2V FRONTAL/LAT  Result Date: 10/12/2023  No acute radiographic abnormality.     Assessment/Plan   Cindy Sesay is a 78 y.o. female presented to [facility] ED from home.  Patient states for the past several days she has decreased energy, poor appetite and general  malaise.  She c/o dyspepsia. She  states this AM she developed mid sternal, non radiating chest pain. She feels the pain is more epigastric. She was recently in Dr. Louis's cardiology office and discussed hydrochlorothiazide. She states that \"years\"ago she was given hydrochlorothiazide and developed dyspepsia.       Principal Problem:    Chest pain  Active Problems:    HTN (hypertension)    HLD (hyperlipidemia)    Anxiety    Common AV valve insufficiency    Fibromyalgia    Asthma    SVT (supraventricular tachycardia)     #Chest pain  #HTN (hypertension)  #HLD (hyperlipidemia)  #Common AV valve insufficiency  #SVT (supraventricular tachycardia)  - CXR shows no acute radiographic abnormality  - Lipid Panel: HDL 56.2, ratio 3.5, , VLDL 15, trig 75, chol 194  - A1c in Feb: 5.2  - EKG NSR  - troponin 4, 5  - BNP 31  - hydrochlorothiazide held 2/2 pt c/o dyspepsia, hypoNa  - telemetry, monitor BP, HR  - continue asa, atorvastatin, benicar  - Cardiology consult, appreciate recs  - Echo done, read pending  ---DC home, start bystolic when RX filled,  ---follow up with Cardiology Monday as scheduled     #Asthma  - had asthma attack last month, states she only gets asthma in the \"fall\"  - duonebs as needed  ---Follow up as needed with PCP     #Anxiety  - continue valium  ---Continue all chronic meds.  ----Follow up as needed with PCP     #Fibromyalgia  - continue gabapentin, tramadol  ---Continue all chronic meds.  ----Follow up as needed with PCP     #Hyponatremia   - Na 1127 > 130   - Received NS Bolus  - Continue NS IV @ 100 ml/hr   - trend BMP  ----Follow up " as needed with PCP     #GERD  - continue PPI, mylanta  ---Continue all chronic meds.  ----Follow up as needed with PCP    Pertinent Physical Exam At Time of Discharge  Physical Exam  Physical Exam  Constitutional:       General: She is not in acute distress.     Appearance: Normal appearance. She is not toxic-appearing.   HENT:      Head: Normocephalic and atraumatic.      Mouth/Throat:      Mouth: Mucous membranes are moist.   Eyes:      Extraocular Movements: Extraocular movements intact.      Conjunctiva/sclera: Conjunctivae normal.      Pupils: Pupils are equal, round, and reactive to light.   Cardiovascular:      Rate and Rhythm: Normal rate and regular rhythm.      Pulses: Normal pulses.      Heart sounds: Normal heart sounds. No murmur heard.     No gallop.   Pulmonary:      Effort: Pulmonary effort is normal. No respiratory distress.      Breath sounds: Normal breath sounds. No wheezing, rhonchi or rales.   Abdominal:      General: Bowel sounds are normal. There is no distension.      Palpations: Abdomen is soft.      Tenderness: There is no abdominal tenderness. There is no guarding or rebound.   Musculoskeletal:         General: No swelling, tenderness, deformity or signs of injury. Normal range of motion.      Cervical back: Normal range of motion and neck supple.   Skin:     General: Skin is warm and dry.      Capillary Refill: Capillary refill takes less than 2 seconds.      Coloration: Skin is not jaundiced.      Findings: No bruising or rash.   Neurological:      General: No focal deficit present.      Mental Status: She is alert and oriented to person, place, and time.      Cranial Nerves: No cranial nerve deficit.      Sensory: No sensory deficit.      Motor: No weakness.      Gait: Gait normal.   Psychiatric:         Mood and Affect: Mood normal.         Behavior: Behavior normal.         Thought Content: Thought content normal.         Judgment: Judgment normal.     ---Stable for discharge.  Total  cumulative time spent in preparation of this discharge including documentation review, coordination of care with the medical team including PT/SW/care coordinators and treating consultants, discussion with patient and pertinent family members and finalization of prescriptions, follow-up appointments, and this discharge summary was approximately 45 minutes.    Outpatient Follow-Up  No future appointments.      Kianna Díaz, APRN-CNP

## 2023-10-24 NOTE — PROGRESS NOTES
"Cindy Sesay is a 78 y.o. female on day 1 of admission presenting with Chest pain.    Subjective   She is sitting in bed, a&ox3, pleasant. She described a headache for 3 weeks affecting her eyes, anterior and posterior head areas, worse in the AM. Her chest pain was mid sternal, non radiating and felt like \"heartburn\".  She described being intolerant to many medications, cardiology has been consulted. We discussed her plan for the day, will continue to monitor.      Objective     Physical Exam  Constitutional:       General: She is not in acute distress.     Appearance: Normal appearance. She is not toxic-appearing.   HENT:      Head: Normocephalic and atraumatic.      Mouth/Throat:      Mouth: Mucous membranes are moist.   Eyes:      Extraocular Movements: Extraocular movements intact.      Conjunctiva/sclera: Conjunctivae normal.      Pupils: Pupils are equal, round, and reactive to light.   Cardiovascular:      Rate and Rhythm: Normal rate and regular rhythm.      Pulses: Normal pulses.      Heart sounds: Normal heart sounds. No murmur heard.     No gallop.   Pulmonary:      Effort: Pulmonary effort is normal. No respiratory distress.      Breath sounds: Normal breath sounds. No wheezing, rhonchi or rales.   Abdominal:      General: Bowel sounds are normal. There is no distension.      Palpations: Abdomen is soft.      Tenderness: There is no abdominal tenderness. There is no guarding or rebound.   Musculoskeletal:         General: No swelling, tenderness, deformity or signs of injury. Normal range of motion.      Cervical back: Normal range of motion and neck supple.   Skin:     General: Skin is warm and dry.      Capillary Refill: Capillary refill takes less than 2 seconds.      Coloration: Skin is not jaundiced.      Findings: No bruising or rash.   Neurological:      General: No focal deficit present.      Mental Status: She is alert and oriented to person, place, and time.      Cranial Nerves: No " "cranial nerve deficit.      Sensory: No sensory deficit.      Motor: No weakness.      Gait: Gait normal.   Psychiatric:         Mood and Affect: Mood normal.         Behavior: Behavior normal.         Thought Content: Thought content normal.         Judgment: Judgment normal.     Last Recorded Vitals  Blood pressure 153/80, pulse 71, temperature 36.6 °C (97.9 °F), temperature source Temporal, resp. rate 14, height 1.651 m (5' 5\"), weight 60.7 kg (133 lb 13.1 oz), SpO2 97 %.    Intake/Output last 3 Shifts:  I/O last 3 completed shifts:  In: 1421.7 (23.4 mL/kg) [P.O.:480; I.V.:941.7 (15.5 mL/kg)]  Out: - (0 mL/kg)   Weight: 60.7 kg     Relevant Results  Scheduled medications  aspirin, 81 mg, oral, Daily  cholecalciferol, 2,000 Units, oral, Daily  enoxaparin, 40 mg, subcutaneous, q24h  gabapentin, 300 mg, oral, BID  [Held by provider] hydroCHLOROthiazide, 12.5 mg, oral, Daily  nebivolol, 2.5 mg, oral, Daily  olmesartan, 20 mg, oral, BID  pantoprazole, 40 mg, oral, Daily before breakfast   Or  pantoprazole, 40 mg, intravenous, Daily before breakfast  perflutren protein A microsphere, 0.5 mL, intravenous, Once in imaging  polyethylene glycol, 17 g, oral, Daily    Continuous medications  sodium chloride 0.9%, 100 mL/hr, Last Rate: 100 mL/hr (10/24/23 0852)    PRN medications  PRN medications: acetaminophen **OR** acetaminophen **OR** acetaminophen, acetaminophen **OR** acetaminophen **OR** acetaminophen, albuterol, alum-mag hydroxide-simeth, cyclobenzaprine, diazePAM, ondansetron **OR** ondansetron, traMADol    XR chest 1 view  Result Date: 10/23/2023  No acute process. Signed by Harshal Desouza MD    XR CHEST 2V FRONTAL/LAT  Result Date: 10/12/2023  No acute radiographic abnormality.      Latest Reference Range & Units 10/23/23 15:22 10/23/23 15:24 10/23/23 16:10 10/24/23 04:26   GLUCOSE 74 - 99 mg/dL 105 (H)   97   SODIUM 136 - 145 mmol/L 127 (L)   130 (L)   POTASSIUM 3.5 - 5.3 mmol/L 3.8   3.6   CHLORIDE 98 - 107 " mmol/L 88 (L)   99   Bicarbonate 21 - 32 mmol/L 26   22   Anion Gap 10 - 20 mmol/L 17   13   Blood Urea Nitrogen 6 - 23 mg/dL 16   13   Creatinine 0.50 - 1.05 mg/dL 0.90   0.77   EGFR >60 mL/min/1.73m*2 66   80   Calcium 8.6 - 10.3 mg/dL 9.5   8.4 (L)   Albumin 3.4 - 5.0 g/dL 4.4      Alkaline Phosphatase 33 - 136 U/L 86      ALT 7 - 45 U/L 8      AST 9 - 39 U/L 17      Bilirubin Total 0.0 - 1.2 mg/dL 0.7      HDL CHOLESTEROL mg/dL    56.2   Cholesterol/HDL Ratio     3.5   LDL Calculated <=99 mg/dL    123 (H)   VLDL 0 - 40 mg/dL    15   TRIGLYCERIDES 0 - 149 mg/dL    75   Non HDL Cholesterol 0 - 149 mg/dL    138   Total Protein 6.4 - 8.2 g/dL 7.5      MAGNESIUM 1.60 - 2.40 mg/dL 1.74      CHOLESTEROL 0 - 199 mg/dL    194   BNP 0 - 99 pg/mL  31     Troponin I, High Sensitivity 0 - 13 ng/L 4  5       [facility] Reference Range & Units 10/23/23 15:22 10/24/23 04:26   WBC 4.4 - 11.3 x10*3/uL 7.9 5.4   nRBC 0.0 - 0.0 /100 WBCs 0.0 0.0   RBC 4.00 - 5.20 x10*6/uL 4.88 4.21   HEMOGLOBIN 12.0 - 16.0 g/dL 14.4 12.5   HEMATOCRIT 36.0 - 46.0 % 41.0 36.0   MCV 80 - 100 fL 84 86   MCH 26.0 - 34.0 pg 29.5 29.7   MCHC 32.0 - 36.0 g/dL 35.1 34.7   RED CELL DISTRIBUTION WIDTH 11.5 - 14.5 % 11.7 11.7   Platelets 150 - 450 x10*3/uL 271 217   MEAN PLATELET VOLUME 7.5 - 11.5 fL 11.0 10.5   Neutrophils % 40.0 - 80.0 % 69.7    Immature Granulocytes %, Automated 0.0 - 0.9 % 0.3    Lymphocytes % 13.0 - 44.0 % 21.2    Monocytes % 2.0 - 10.0 % 7.3    Eosinophils % 0.0 - 6.0 % 1.0    Basophils % 0.0 - 2.0 % 0.5    Neutrophils Absolute 1.60 - 5.50 x10*3/uL 5.54 (H)    Immature Granulocytes Absolute, Automated 0.00 - 0.50 x10*3/uL 0.02    Lymphocytes Absolute 0.80 - 3.00 x10*3/uL 1.68    Monocytes Absolute 0.05 - 0.80 x10*3/uL 0.58    Eosinophils Absolute 0.00 - 0.40 x10*3/uL 0.08    Basophils Absolute 0.00 - 0.10 x10*3/uL 0.04      Assessment/Plan   Cindy Sesay is a 78 y.o. female presented to [facility] ED from home.  Patient states for the  "past several days she has decreased energy, poor appetite and general  malaise.  She c/o dyspepsia. She  states this AM she developed mid sternal, non radiating chest pain. She feels the pain is more epigastric. She was recently in Dr. Louis's cardiology office and discussed hydrochlorothiazide. She states that \"years\"ago she was given hydrochlorothiazide and developed dyspepsia.      Principal Problem:    Chest pain  Active Problems:    HTN (hypertension)    HLD (hyperlipidemia)    Anxiety    Common AV valve insufficiency    Fibromyalgia    Asthma    SVT (supraventricular tachycardia)    #Chest pain  #HTN (hypertension)  #HLD (hyperlipidemia)  #Common AV valve insufficiency  #SVT (supraventricular tachycardia)  - CXR shows no acute radiographic abnormality  - Lipid Panel: HDL 56.2, ratio 3.5, , VLDL 15, trig 75, chol 194  - A1c in Feb: 5.2  - EKG NSR  - troponin 4, 5  - BNP 31  - hydrochlorothiazide held 2/2 pt c/o dyspepsia, hypoNa  - telemetry, monitor BP, HR  - continue asa, atorvastatin, benicar  - Cardiology consult, appreciate recs  - Echo pending    #Asthma  - had asthma attack last month, states she only gets asthma in the \"fall\"  - duonebs as needed    #Anxiety  - continue valium    #Fibromyalgia  - continue gabapentin, tramadol     #Hyponatremia   - Na 1127 > 130   - Received NS Bolus  - Continue NS IV @ 100 ml/hr   - trend BMP     #GERD  - continue PPI, mylanta     GI ppx: PPI  DVT ppx: lovenox    Fluids: as tolerated  Electrolytes: replace as needed  Nutrition: cardiac  Adjuncts: PIV    Code Status: full code  Pt requires inpatient stay at this time.  Total accumulated time spent face to face and not face to face preparing to see the patient, obtaining and reviewing separately obtained history; performing a medically appropriate examination and/or evaluation; counseling and educating the patient, family; ordering medications, tests, or procedures; referring and communicating with other health care " professionals; documenting clinical information in the patient's medical record; independently interpreting results and communicating the results to the patient, family; and care coordination was 30 minutes.    SINGH Tyler-CNP

## 2023-10-24 NOTE — H&P
"                     History and Physical         Cnidy Sesay 78 y.o. 1945     History Of Present Illness  Cindy Sesay is a 78 y.o. female presented to [facility] ED from home.  Patient states  for the past several  days she has decreased energy,  poor appetite and general  malaise.  She c/o dyspepsia. She  states this AM   she  developed mid sternal  non radiating chest pain. She feels the pain more epigastric. She was  recently in  Dr. Louis's cardiology office and  discussed  hydrochlorothiazide. She states that  \"years\"ago  she was given hydrochlorothiazide and developed dyspepsia.  In ED Troponin Negative  EKG unremarkable.  Patient requested Mylanta. She reported positive  results from  Mylanta.  Patient denies Pain, CP, N/V/D SOB. No  acute distress          Past Medical History  Past Medical History:   Diagnosis Date    Personal history of other diseases of the circulatory system 03/04/2020    History of hypertension    Personal history of other diseases of the musculoskeletal system and connective tissue 03/04/2020    History of fibromyalgia        Surgical History  She has a past surgical history that includes Other surgical history (03/04/2020).     Social History  Social History     Socioeconomic History    Marital status:      Spouse name: Not on file    Number of children: Not on file    Years of education: Not on file    Highest education level: Not on file   Occupational History    Not on file   Tobacco Use    Smoking status: Former     Types: Cigarettes    Smokeless tobacco: Never   Substance and Sexual Activity    Alcohol use: Not on file    Drug use: Not on file    Sexual activity: Not on file   Other Topics Concern    Not on file   Social History Narrative    Not on file     Social Determinants of Health     Financial Resource Strain: Low Risk  (10/23/2023)    Overall Financial Resource Strain (CARDIA)     Difficulty of Paying Living Expenses: Not hard at all   Food " Insecurity: Not on file   Transportation Needs: No Transportation Needs (10/23/2023)    PRAPARE - Transportation     Lack of Transportation (Medical): No     Lack of Transportation (Non-Medical): No   Physical Activity: Not on file   Stress: Not on file   Social Connections: Not on file   Intimate Partner Violence: Not on file   Housing Stability: Low Risk  (10/23/2023)    Housing Stability Vital Sign     Unable to Pay for Housing in the Last Year: No     Number of Places Lived in the Last Year: 1     Unstable Housing in the Last Year: No        Family History  No family history on file.     Allergies  Allergies   Allergen Reactions    Diltiazem Other     Chest Pain    Lopressor [Metoprolol Tartrate] Shortness of breath and Hallucinations    Molds Extract Shortness of breath    Flagyl [Metronidazole] Nausea/vomiting    Paxil [Paroxetine Hcl] Chills and Nausea/vomiting        Vital Signs  Temp:  [36.1 °C (97 °F)-36.7 °C (98.1 °F)] 36.1 °C (97 °F)  Heart Rate:  [] 65  Resp:  [14-27] 18  BP: (148-170)/() 149/78    Home Medications   Prior to Admission Medications   Prescriptions Last Dose Informant Patient Reported? Taking?   albuterol 2.5 mg /3 mL (0.083 %) nebulizer solution Past Month  Yes Yes   Sig: Inhale 3 mL (2.5 mg) every 4 hours if needed for shortness of breath.   albuterol 90 mcg/actuation inhaler Past Month  Yes Yes   Sig: Inhale 2 puffs every 6 hours if needed for shortness of breath.   aspirin 81 mg EC tablet 10/23/2023  Yes No   Sig: Take 1 tablet (81 mg) by mouth once daily.   cholecalciferol (Vitamin D-3) 25 MCG (1000 UT) tablet 10/23/2023  Yes Yes   Sig: Take 2 tablets (2,000 Units) by mouth once daily.   cyclobenzaprine (Flexeril) 5 mg tablet More than a month  Yes Yes   Sig: Take 1 tablet (5 mg) by mouth 2 times a day as needed.   diazePAM (Valium) 2 mg tablet Past Month  Yes Yes   Sig: Take 1 tablet (2 mg) by mouth once daily as needed.   gabapentin (Neurontin) 300 mg capsule Past Month   Yes Yes   Sig: Take 1 capsule (300 mg) by mouth 2 times a day.   hydroCHLOROthiazide (Microzide) 12.5 mg capsule 10/23/2023  Yes Yes   Sig: Take 1 capsule (12.5 mg) by mouth once daily.   olmesartan (BENIcar) 20 mg tablet 10/23/2023  Yes Yes   Sig: Take 1 tablet (20 mg) by mouth twice a day.   traMADol (Ultram) 50 mg tablet Past Week  Yes No   Sig: Take 0.5 tablets (25 mg) by mouth as needed at bedtime.      Facility-Administered Medications: None       New Hospital Orders  Current Facility-Administered Medications   Medication Dose Route Frequency Provider Last Rate Last Admin    acetaminophen (Tylenol) tablet 650 mg  650 mg oral q4h PRN BONITA Lucas   650 mg at 10/23/23 2118    Or    acetaminophen (Tylenol) oral liquid 650 mg  650 mg nasogastric tube q4h PRN BONITA Lucas        Or    acetaminophen (Tylenol) suppository 650 mg  650 mg rectal q4h PRN BONITA Lucas        acetaminophen (Tylenol) tablet 650 mg  650 mg oral q4h PRN BONITA Lucas        Or    acetaminophen (Tylenol) oral liquid 650 mg  650 mg oral q4h PRN BONITA Lucas        Or    acetaminophen (Tylenol) suppository 650 mg  650 mg rectal q4h PRN BONITA Lucas        albuterol 2.5 mg /3 mL (0.083 %) nebulizer solution 2.5 mg  2.5 mg nebulization q4h PRN BONITA Segovia        albuterol 90 mcg/actuation inhaler 2 puff  2 puff inhalation q6h PRN BONITA Segovia        alum-mag hydroxide-simeth (Mylanta) 200-200-20 mg/5 mL oral suspension 30 mL  30 mL oral q6h PRN BONITA Segovia   30 mL at 10/23/23 2118    aspirin EC tablet 81 mg  81 mg oral Daily BONITA Lucas        aspirin EC tablet 81 mg  81 mg oral Daily BONITA Segovia        cholecalciferol (Vitamin D-3) tablet 2,000 Units  2,000 Units oral Daily BONITA Segovia        cyclobenzaprine (Flexeril) tablet 5 mg  5 mg oral BID PRN BONITA Segovia         diazePAM (Valium) tablet 2 mg  2 mg oral Daily PRN BONITA Segovia        enoxaparin (Lovenox) syringe 40 mg  40 mg subcutaneous q24h BONITA Lucas        gabapentin (Neurontin) capsule 300 mg  300 mg oral BID SINGH Segovia-CNP   300 mg at 10/24/23 0153    hydroCHLOROthiazide (HYDRODiuril) tablet 12.5 mg  12.5 mg oral Daily BONITA Segovia        olmesartan (BENIcar) tablet 20 mg  20 mg oral BID BONITA Segovia   20 mg at 10/24/23 0145    ondansetron (Zofran) tablet 4 mg  4 mg oral q8h PRN BONITA Lucas        Or    ondansetron (Zofran) injection 4 mg  4 mg intravenous q8h PRN BONITA Lucas        pantoprazole (ProtoNix) EC tablet 40 mg  40 mg oral Daily before breakfast BONITA Lucas        Or    pantoprazole (ProtoNix) injection 40 mg  40 mg intravenous Daily before breakfast BONITA Lucas        perflutren protein A microsphere (Optison) injection 0.5 mL  0.5 mL intravenous Once in imaging BONITA Lucas        polyethylene glycol (Glycolax, Miralax) packet 17 g  17 g oral Daily BONITA Lucas        sodium chloride 0.9% infusion  100 mL/hr intravenous Continuous BONITA Lucas 100 mL/hr at 10/23/23 2118 100 mL/hr at 10/23/23 2118    traMADol (Ultram) tablet 25 mg  25 mg oral Nightly PRN BONITA Segovia          Review of Systems   Constitutional:  Positive for activity change and fatigue.   HENT: Negative.     Eyes: Negative.    Respiratory: Negative.     Cardiovascular: Negative.    Gastrointestinal:         Dyspepsia    Genitourinary: Negative.    Musculoskeletal: Negative.    Neurological: Negative.    Hematological: Negative.    Psychiatric/Behavioral: Negative.           Physical Exam  Constitutional:       Appearance: Normal appearance.   HENT:      Head: Normocephalic and atraumatic.      Nose: Nose normal.      Mouth/Throat:      Mouth: Mucous membranes are  "dry.   Eyes:      Extraocular Movements: Extraocular movements intact.      Pupils: Pupils are equal, round, and reactive to light.   Cardiovascular:      Rate and Rhythm: Normal rate and regular rhythm.      Pulses: Normal pulses.      Heart sounds: Normal heart sounds.   Pulmonary:      Effort: Pulmonary effort is normal.      Breath sounds: Normal breath sounds.   Abdominal:      General: Abdomen is flat.      Palpations: Abdomen is soft.      Tenderness: There is abdominal tenderness.   Musculoskeletal:         General: Normal range of motion.      Cervical back: Normal range of motion and neck supple.   Skin:     General: Skin is warm and dry.   Neurological:      General: No focal deficit present.      Mental Status: She is alert and oriented to person, place, and time.   Psychiatric:      Comments: Anxious               Last Recorded Vitals  Blood pressure 149/78, pulse 65, temperature 36.1 °C (97 °F), temperature source Temporal, resp. rate 18, height 1.651 m (5' 5\"), weight 60.7 kg (133 lb 13.1 oz), SpO2 97 %.    Relevant Results  Results for orders placed or performed during the hospital encounter of 10/23/23   CBC and Auto Differential   Result Value Ref Range    WBC 7.9 4.4 - 11.3 x10*3/uL    nRBC 0.0 0.0 - 0.0 /100 WBCs    RBC 4.88 4.00 - 5.20 x10*6/uL    Hemoglobin 14.4 12.0 - 16.0 g/dL    Hematocrit 41.0 36.0 - 46.0 %    MCV 84 80 - 100 fL    MCH 29.5 26.0 - 34.0 pg    MCHC 35.1 32.0 - 36.0 g/dL    RDW 11.7 11.5 - 14.5 %    Platelets 271 150 - 450 x10*3/uL    MPV 11.0 7.5 - 11.5 fL    Neutrophils % 69.7 40.0 - 80.0 %    Immature Granulocytes %, Automated 0.3 0.0 - 0.9 %    Lymphocytes % 21.2 13.0 - 44.0 %    Monocytes % 7.3 2.0 - 10.0 %    Eosinophils % 1.0 0.0 - 6.0 %    Basophils % 0.5 0.0 - 2.0 %    Neutrophils Absolute 5.54 (H) 1.60 - 5.50 x10*3/uL    Immature Granulocytes Absolute, Automated 0.02 0.00 - 0.50 x10*3/uL    Lymphocytes Absolute 1.68 0.80 - 3.00 x10*3/uL    Monocytes Absolute 0.58 " 0.05 - 0.80 x10*3/uL    Eosinophils Absolute 0.08 0.00 - 0.40 x10*3/uL    Basophils Absolute 0.04 0.00 - 0.10 x10*3/uL   Comprehensive Metabolic Panel   Result Value Ref Range    Glucose 105 (H) 74 - 99 mg/dL    Sodium 127 (L) 136 - 145 mmol/L    Potassium 3.8 3.5 - 5.3 mmol/L    Chloride 88 (L) 98 - 107 mmol/L    Bicarbonate 26 21 - 32 mmol/L    Anion Gap 17 10 - 20 mmol/L    Urea Nitrogen 16 6 - 23 mg/dL    Creatinine 0.90 0.50 - 1.05 mg/dL    eGFR 66 >60 mL/min/1.73m*2    Calcium 9.5 8.6 - 10.3 mg/dL    Albumin 4.4 3.4 - 5.0 g/dL    Alkaline Phosphatase 86 33 - 136 U/L    Total Protein 7.5 6.4 - 8.2 g/dL    AST 17 9 - 39 U/L    Bilirubin, Total 0.7 0.0 - 1.2 mg/dL    ALT 8 7 - 45 U/L   Magnesium   Result Value Ref Range    Magnesium 1.74 1.60 - 2.40 mg/dL   B-type natriuretic peptide   Result Value Ref Range    BNP 31 0 - 99 pg/mL   Protime-INR   Result Value Ref Range    Protime 12.8 9.8 - 12.8 seconds    INR 1.1 0.9 - 1.1   Troponin I, High Sensitivity, Initial   Result Value Ref Range    Troponin I, High Sensitivity 4 0 - 13 ng/L   Urinalysis with Reflex Microscopic and Culture   Result Value Ref Range    Color, Urine Straw Straw, Yellow    Appearance, Urine Clear Clear    Specific Gravity, Urine 1.006 1.005 - 1.035    pH, Urine 6.0 5.0, 5.5, 6.0, 6.5, 7.0, 7.5, 8.0    Protein, Urine NEGATIVE NEGATIVE mg/dL    Glucose, Urine NEGATIVE NEGATIVE mg/dL    Blood, Urine NEGATIVE NEGATIVE    Ketones, Urine 5 (TRACE) (A) NEGATIVE mg/dL    Bilirubin, Urine NEGATIVE NEGATIVE    Urobilinogen, Urine <2.0 <2.0 mg/dL    Nitrite, Urine NEGATIVE NEGATIVE    Leukocyte Esterase, Urine NEGATIVE NEGATIVE   Extra Urine Gray Tube   Result Value Ref Range    Extra Tube Hold for add-ons.    Troponin, High Sensitivity, 1 Hour   Result Value Ref Range    Troponin I, High Sensitivity 5 0 - 13 ng/L        Imaging   XR chest 1 view    Result Date: 10/23/2023  STUDY: Chest Radiograph;  10/23/2023 3:30 PM. INDICATION: Hypertension, chest  tightness. COMPARISON: None Available. ACCESSION NUMBER(S): [id number] ORDERING CLINICIAN: CAM FORD TECHNIQUE:  Frontal chest was obtained at 15:29 hours. FINDINGS: CARDIOMEDIASTINAL SILHOUETTE: Cardiomediastinal silhouette is normal in size and configuration.  LUNGS: Lungs are clear.  ABDOMEN: No remarkable upper abdominal findings.  BONES: No acute osseous changes.    No acute process. Signed by Harshal Desouza MD    XR CHEST 2V FRONTAL/LAT    Result Date: 10/12/2023  * * *Final Report* * * DATE OF EXAM: Oct 12 2023 12:17PM   MAX   [id number]  -  XR CHEST 2V FRONTAL/LAT  / ACCESSION #  [id number] PROCEDURE REASON: Chest pain, unspecified type      * * * * Physician Interpretation * * * *  EXAMINATION:  CHEST RADIOGRAPH (2 VIEW FRONTAL & LATERAL) CLINICAL HISTORY: Chest pain, unspecified type MQ:  [id number] EXAM DATE/TIME:  10/12/2023 12:17 PM COMPARISON:  02/07/2018 RESULT: Lines, tubes, and devices:  None. Lungs and pleura:  No consolidation. No lung mass. No pleural effusion. No pneumothorax. Cardiomediastinal silhouette:  Normal cardiomediastinal silhouette.   Tortuous aorta. Bones and soft tissues:  Unremarkable.    IMPRESSION: No acute radiographic abnormality. : PSCB   Transcribe Date/Time: Oct 12 2023  1:47P Dictated by : ANDRIA HURT MD This examination was interpreted and the report reviewed and electronically signed by: ANDRIA HURT MD on Oct 12 2023  1:47PM  EST        Assessment/Plan   Principal Problem:    Chest pain    ##Chest Pain  -- CXR  No acute radiographic abnormality.  -EKG NSR   - Troponin 4  - BNP 31  - Aspirin 81 mg daily  - Atorvastatin 80 mg at bedtime  - Lipid Panel pending  - Pending HbA1c  - Continuous telemetry monitoring  - Nitroglycerin as needed if BP appropriate  -Ordered  Echo   --Cardiology consulted, appreciate recommendations    # Hyponatremia   On Admission Sodium 127   Received NS Bolus  Continue NS IV @ 100  ml/hr     # HTN   Hold  hydrochlorothiazide r/t hyponatremia  and patient request r/t dyspepsia   Continue home meds Benicar     #GERD  Patient c/o dyspepsia   Order   Mylanta   Continue home meds  Protonix     # Anxiety   Patient endorses worsening anxiety   Requests home  med Valium- ordered     DVT Prophylaxis Lovenox   Fluids: NS @ 100 ml/HR   Electrolytes: replace as needed  Nutrition:  Regular   Adjuncts: PIV      Total accumulated time spent face to face and not face to face preparing to see the patient, obtaining and reviewing separately obtained history; performing a medically appropriate examination and/or evaluation; counseling and educating the patient, family; ordering medications, tests, or procedures; referring and communicating with other health care professionals; documenting clinical information in the patient's medical record; independently interpreting results and communicating the results to the patient, family; and care coordination was 40 minutes      BONITA Segovia

## 2023-10-24 NOTE — CONSULTS
Inpatient consult to Cardiology  Consult performed by: SINGH Randolph-CNP  Consult ordered by: BONITA Lucas  Reason for consult: chest pain          History Of Present Illness  Cindy Sesay is a 78 y.o. female presenting with complaints of chest pain and fatigue. She was recently started on hydrochlorothiazide by her PCP due to elevated BP. Since then, she noticed she has been more fatigued and easily tired. Sunday, she developed substernal pain that was a burning sensation. She states anytime she drank something, she could feel the burning all the way down. She reports a similar symptom when she was on hydrochlorothiazide in the past. She received mylanta in the ER which took the pain away. She does report a headache and elevated BP. Lab work in the ER was unremarkable except for sodium of 127. EKG showed SR, no acute ischemic changes. CXR negative.       Past Medical History  She has a past medical history of Personal history of other diseases of the circulatory system (03/04/2020) and Personal history of other diseases of the musculoskeletal system and connective tissue (03/04/2020).  HTN, HLP, paroxysmal SVT, anxiety  Surgical History  She has a past surgical history that includes Other surgical history (03/04/2020).     Social History  She reports that she has quit smoking. Her smoking use included cigarettes. She has never used smokeless tobacco. No history on file for alcohol use and drug use.    Family History  No family history on file.     Allergies  Diltiazem, Lopressor [metoprolol tartrate], Molds extract, Flagyl [metronidazole], and Paxil [paroxetine hcl]    Review of Systems  Review of Systems   Constitutional:  Positive for fatigue.   Cardiovascular:  Positive for chest pain.   Neurological:  Positive for weakness and headaches.   All other systems reviewed and are negative.         Physical Exam  Constitutional: Well developed, awake/alert/oriented x3, no distress, alert  "and cooperative  Eyes: PERRL, EOMI, clear sclera  ENMT: mucous membranes moist, no apparent injury, no lesions seen  Head/Neck: Neck supple, no apparent injury, thyroid without mass or tenderness, No JVD, trachea midline, no bruits  Respiratory/Thorax: Patent airways, CTAB, normal breath sounds with good chest expansion, thorax symmetric  Cardiovascular: Regular, rate and rhythm, no murmurs, 2+ equal pulses of the extremities, normal S 1and S 2  Gastrointestinal: Nondistended, soft, non-tender, no rebound tenderness or guarding, no masses palpable, no organomegaly, +BS, no bruits  Musculoskeletal: ROM intact, no joint swelling, normal strength  Extremities: normal extremities, no cyanosis edema, contusions or wounds, no clubbing  Neurological: alert and oriented x3, intact senses, motor, response and reflexes, normal strength  Lymphatic: No significant lymphadenopathy  Psychological: Appropriate mood and behavior  Skin: Warm and dry, no lesions, no rashes       Last Recorded Vitals  Blood pressure 153/80, pulse 71, temperature 36.6 °C (97.9 °F), temperature source Temporal, resp. rate 14, height 1.651 m (5' 5\"), weight 60.7 kg (133 lb 13.1 oz), SpO2 97 %.    Relevant Results    Scheduled medications  aspirin, 81 mg, oral, Daily  cholecalciferol, 2,000 Units, oral, Daily  enoxaparin, 40 mg, subcutaneous, q24h  gabapentin, 300 mg, oral, BID  [Held by provider] hydroCHLOROthiazide, 12.5 mg, oral, Daily  nebivolol, 2.5 mg, oral, Daily  olmesartan, 20 mg, oral, BID  pantoprazole, 40 mg, oral, Daily before breakfast   Or  pantoprazole, 40 mg, intravenous, Daily before breakfast  perflutren protein A microsphere, 0.5 mL, intravenous, Once in imaging  polyethylene glycol, 17 g, oral, Daily      Continuous medications     PRN medications  PRN medications: acetaminophen **OR** acetaminophen **OR** acetaminophen, acetaminophen **OR** acetaminophen **OR** acetaminophen, albuterol, alum-mag hydroxide-simeth, cyclobenzaprine, " diazePAM, ondansetron **OR** ondansetron, traMADol    Results for orders placed or performed during the hospital encounter of 10/23/23 (from the past 24 hour(s))   CBC and Auto Differential   Result Value Ref Range    WBC 7.9 4.4 - 11.3 x10*3/uL    nRBC 0.0 0.0 - 0.0 /100 WBCs    RBC 4.88 4.00 - 5.20 x10*6/uL    Hemoglobin 14.4 12.0 - 16.0 g/dL    Hematocrit 41.0 36.0 - 46.0 %    MCV 84 80 - 100 fL    MCH 29.5 26.0 - 34.0 pg    MCHC 35.1 32.0 - 36.0 g/dL    RDW 11.7 11.5 - 14.5 %    Platelets 271 150 - 450 x10*3/uL    MPV 11.0 7.5 - 11.5 fL    Neutrophils % 69.7 40.0 - 80.0 %    Immature Granulocytes %, Automated 0.3 0.0 - 0.9 %    Lymphocytes % 21.2 13.0 - 44.0 %    Monocytes % 7.3 2.0 - 10.0 %    Eosinophils % 1.0 0.0 - 6.0 %    Basophils % 0.5 0.0 - 2.0 %    Neutrophils Absolute 5.54 (H) 1.60 - 5.50 x10*3/uL    Immature Granulocytes Absolute, Automated 0.02 0.00 - 0.50 x10*3/uL    Lymphocytes Absolute 1.68 0.80 - 3.00 x10*3/uL    Monocytes Absolute 0.58 0.05 - 0.80 x10*3/uL    Eosinophils Absolute 0.08 0.00 - 0.40 x10*3/uL    Basophils Absolute 0.04 0.00 - 0.10 x10*3/uL   Comprehensive Metabolic Panel   Result Value Ref Range    Glucose 105 (H) 74 - 99 mg/dL    Sodium 127 (L) 136 - 145 mmol/L    Potassium 3.8 3.5 - 5.3 mmol/L    Chloride 88 (L) 98 - 107 mmol/L    Bicarbonate 26 21 - 32 mmol/L    Anion Gap 17 10 - 20 mmol/L    Urea Nitrogen 16 6 - 23 mg/dL    Creatinine 0.90 0.50 - 1.05 mg/dL    eGFR 66 >60 mL/min/1.73m*2    Calcium 9.5 8.6 - 10.3 mg/dL    Albumin 4.4 3.4 - 5.0 g/dL    Alkaline Phosphatase 86 33 - 136 U/L    Total Protein 7.5 6.4 - 8.2 g/dL    AST 17 9 - 39 U/L    Bilirubin, Total 0.7 0.0 - 1.2 mg/dL    ALT 8 7 - 45 U/L   Magnesium   Result Value Ref Range    Magnesium 1.74 1.60 - 2.40 mg/dL   Protime-INR   Result Value Ref Range    Protime 12.8 9.8 - 12.8 seconds    INR 1.1 0.9 - 1.1   Troponin I, High Sensitivity, Initial   Result Value Ref Range    Troponin I, High Sensitivity 4 0 - 13 ng/L    B-type natriuretic peptide   Result Value Ref Range    BNP 31 0 - 99 pg/mL   Urinalysis with Reflex Microscopic and Culture   Result Value Ref Range    Color, Urine Straw Straw, Yellow    Appearance, Urine Clear Clear    Specific Gravity, Urine 1.006 1.005 - 1.035    pH, Urine 6.0 5.0, 5.5, 6.0, 6.5, 7.0, 7.5, 8.0    Protein, Urine NEGATIVE NEGATIVE mg/dL    Glucose, Urine NEGATIVE NEGATIVE mg/dL    Blood, Urine NEGATIVE NEGATIVE    Ketones, Urine 5 (TRACE) (A) NEGATIVE mg/dL    Bilirubin, Urine NEGATIVE NEGATIVE    Urobilinogen, Urine <2.0 <2.0 mg/dL    Nitrite, Urine NEGATIVE NEGATIVE    Leukocyte Esterase, Urine NEGATIVE NEGATIVE   Extra Urine Gray Tube   Result Value Ref Range    Extra Tube Hold for add-ons.    Troponin, High Sensitivity, 1 Hour   Result Value Ref Range    Troponin I, High Sensitivity 5 0 - 13 ng/L   CBC   Result Value Ref Range    WBC 5.4 4.4 - 11.3 x10*3/uL    nRBC 0.0 0.0 - 0.0 /100 WBCs    RBC 4.21 4.00 - 5.20 x10*6/uL    Hemoglobin 12.5 12.0 - 16.0 g/dL    Hematocrit 36.0 36.0 - 46.0 %    MCV 86 80 - 100 fL    MCH 29.7 26.0 - 34.0 pg    MCHC 34.7 32.0 - 36.0 g/dL    RDW 11.7 11.5 - 14.5 %    Platelets 217 150 - 450 x10*3/uL    MPV 10.5 7.5 - 11.5 fL   Basic metabolic panel   Result Value Ref Range    Glucose 97 74 - 99 mg/dL    Sodium 130 (L) 136 - 145 mmol/L    Potassium 3.6 3.5 - 5.3 mmol/L    Chloride 99 98 - 107 mmol/L    Bicarbonate 22 21 - 32 mmol/L    Anion Gap 13 10 - 20 mmol/L    Urea Nitrogen 13 6 - 23 mg/dL    Creatinine 0.77 0.50 - 1.05 mg/dL    eGFR 80 >60 mL/min/1.73m*2    Calcium 8.4 (L) 8.6 - 10.3 mg/dL   Lipid panel   Result Value Ref Range    Cholesterol 194 0 - 199 mg/dL    HDL-Cholesterol 56.2 mg/dL    Cholesterol/HDL Ratio 3.5     LDL Calculated 123 (H) <=99 mg/dL    VLDL 15 0 - 40 mg/dL    Triglycerides 75 0 - 149 mg/dL    Non HDL Cholesterol 138 0 - 149 mg/dL   Transthoracic Echo (TTE) Complete   Result Value Ref Range    BSA 1.67 m2       Transthoracic Echo (TTE)  Complete         XR chest 1 view   Final Result   No acute process.   Signed by Harshal Desouza MD          No echocardiogram results found for the past 12 months       Assessment/Plan   Echo from April 2022: LVEF 60%, mild aortic stenosis, mild to mod AI/MR/TR, abnormal LV relaxation    Chest pain  -Troponin negative  -I reviewed the EKG, no acute changes, ST elevation or depression  -CXR negative  -Echo as above  -Repeat echo completed, to be reviewed  -Chest pain appears to be GI related->relieved with Mylanta  -Suggest outpt stress test->already scheduled for Monday    2. Hypertension  -BP has been elevated->Dr. Louis and PCP have been adjusting meds  -Cardizem caused chest pain, Amlodipine swelling, metoprolol shortness of breath and nightmares, hydrochlorothiazide causes hyponatremia and GERD  -Discussed with Dr. Louis, will try Bystolic 2.5mg daily and monitor for side effects   -2gm na diet  -Monitor    3. Hyperlipidemia  -refuses treatment  -Advised low chol, low fat diet    4. GERD  -Cont PPI    Plan of care discussed with hospitalist    SINGH Randolph-CNP

## 2023-12-25 LAB
ATRIAL RATE: 82 BPM
ATRIAL RATE: 82 BPM
P AXIS: 43 DEGREES
P AXIS: 45 DEGREES
P OFFSET: 196 MS
P OFFSET: 207 MS
P ONSET: 149 MS
P ONSET: 149 MS
PR INTERVAL: 150 MS
PR INTERVAL: 150 MS
Q ONSET: 224 MS
Q ONSET: 224 MS
QRS COUNT: 13 BEATS
QRS COUNT: 14 BEATS
QRS DURATION: 74 MS
QRS DURATION: 74 MS
QT INTERVAL: 374 MS
QT INTERVAL: 384 MS
QTC CALCULATION(BAZETT): 436 MS
QTC CALCULATION(BAZETT): 448 MS
QTC FREDERICIA: 415 MS
QTC FREDERICIA: 426 MS
R AXIS: -21 DEGREES
R AXIS: -21 DEGREES
T AXIS: -13 DEGREES
T AXIS: 7 DEGREES
T OFFSET: 411 MS
T OFFSET: 416 MS
VENTRICULAR RATE: 82 BPM
VENTRICULAR RATE: 82 BPM

## 2024-03-15 ENCOUNTER — APPOINTMENT (OUTPATIENT)
Dept: OTOLARYNGOLOGY | Facility: CLINIC | Age: 79
End: 2024-03-15
Payer: MEDICARE

## 2026-03-09 ENCOUNTER — APPOINTMENT (OUTPATIENT)
Dept: DERMATOLOGY | Facility: CLINIC | Age: 81
End: 2026-03-09
Payer: MEDICARE